# Patient Record
Sex: FEMALE | Race: WHITE | NOT HISPANIC OR LATINO | URBAN - METROPOLITAN AREA
[De-identification: names, ages, dates, MRNs, and addresses within clinical notes are randomized per-mention and may not be internally consistent; named-entity substitution may affect disease eponyms.]

---

## 2020-06-25 ENCOUNTER — TELEPHONE ENCOUNTER (OUTPATIENT)
Dept: URBAN - METROPOLITAN AREA CLINIC 128 | Facility: CLINIC | Age: 75
End: 2020-06-25

## 2020-07-10 ENCOUNTER — OFFICE VISIT (OUTPATIENT)
Dept: URBAN - METROPOLITAN AREA CLINIC 128 | Facility: CLINIC | Age: 75
End: 2020-07-10
Payer: MEDICARE

## 2020-07-10 DIAGNOSIS — Z12.11 COLON CANCER SCREENING: ICD-10-CM

## 2020-07-10 DIAGNOSIS — R12 HEARTBURN: ICD-10-CM

## 2020-07-10 DIAGNOSIS — R13.19 OTHER DYSPHAGIA: ICD-10-CM

## 2020-07-10 PROCEDURE — 3017F COLORECTAL CA SCREEN DOC REV: CPT | Performed by: INTERNAL MEDICINE

## 2020-07-10 PROCEDURE — G9903 PT SCRN TBCO ID AS NON USER: HCPCS | Performed by: INTERNAL MEDICINE

## 2020-07-10 PROCEDURE — 99214 OFFICE O/P EST MOD 30 MIN: CPT | Performed by: INTERNAL MEDICINE

## 2020-07-10 PROCEDURE — G8427 DOCREV CUR MEDS BY ELIG CLIN: HCPCS | Performed by: INTERNAL MEDICINE

## 2020-07-10 PROCEDURE — 1036F TOBACCO NON-USER: CPT | Performed by: INTERNAL MEDICINE

## 2020-07-10 RX ORDER — ESOMEPRAZOLE MAGNESIUM 40 MG/1
CAPSULE, DELAYED RELEASE PELLETS ORAL
Qty: 0 | Refills: 0 | Status: ACTIVE | COMMUNITY
Start: 1900-01-01

## 2020-07-10 RX ORDER — MECLIZINE HYDROCHLORIDE 25 MG/1
TABLET ORAL
Qty: 0 | Refills: 0 | Status: ACTIVE | COMMUNITY
Start: 1900-01-01

## 2020-07-10 RX ORDER — SIMVASTATIN 20 MG/1
TABLET, FILM COATED ORAL
Qty: 0 | Refills: 0 | Status: ACTIVE | COMMUNITY
Start: 1900-01-01

## 2020-07-10 RX ORDER — BUTALBITAL, ASPIRIN, AND CAFFEINE 50; 325; 40 MG/1; MG/1; MG/1
CAPSULE ORAL
Qty: 0 | Refills: 0 | Status: ACTIVE | COMMUNITY
Start: 1900-01-01

## 2020-07-10 RX ORDER — TROSPIUM CHLORIDE 60 MG/1
TAKE 1 CAPSULE (60 MG) BY ORAL ROUTE ONCE DAILY ON AN EMPTY STOMACH 1 HOUR BEFORE MEAL(S) CAPSULE, EXTENDED RELEASE ORAL 1
Qty: 0 | Refills: 0 | Status: ACTIVE | COMMUNITY
Start: 1900-01-01

## 2020-07-10 RX ORDER — ASPIRIN 325 MG/1
TABLET ORAL
Qty: 0 | Refills: 0 | Status: ACTIVE | COMMUNITY
Start: 1900-01-01

## 2020-07-10 RX ORDER — UBIDECARENONE 50 MG
CAPSULE ORAL
Qty: 0 | Refills: 0 | Status: ACTIVE | COMMUNITY
Start: 1900-01-01

## 2020-07-10 RX ORDER — GABAPENTIN 300 MG/1
CAPSULE ORAL
Qty: 0 | Refills: 0 | Status: ACTIVE | COMMUNITY
Start: 1900-01-01

## 2020-07-10 NOTE — PHYSICAL EXAM HENT:
Head,  normocephalic Ears,  External ears within normal limits Nose/Nasopharynx,  External nose  normal appearance  Lips,  Appearance normal

## 2020-07-17 ENCOUNTER — TELEPHONE ENCOUNTER (OUTPATIENT)
Dept: URBAN - METROPOLITAN AREA CLINIC 128 | Facility: CLINIC | Age: 75
End: 2020-07-17

## 2020-10-09 ENCOUNTER — OFFICE VISIT (OUTPATIENT)
Dept: URBAN - METROPOLITAN AREA MEDICAL CENTER 28 | Facility: MEDICAL CENTER | Age: 75
End: 2020-10-09
Payer: MEDICARE

## 2020-10-09 DIAGNOSIS — R13.19 CERVICAL DYSPHAGIA: ICD-10-CM

## 2020-10-09 PROCEDURE — 91010 ESOPHAGUS MOTILITY STUDY: CPT | Performed by: INTERNAL MEDICINE

## 2020-10-16 ENCOUNTER — TELEPHONE ENCOUNTER (OUTPATIENT)
Dept: URBAN - METROPOLITAN AREA CLINIC 92 | Facility: CLINIC | Age: 75
End: 2020-10-16

## 2020-11-18 ENCOUNTER — WEB ENCOUNTER (OUTPATIENT)
Dept: URBAN - METROPOLITAN AREA CLINIC 19 | Facility: CLINIC | Age: 75
End: 2020-11-18

## 2020-11-18 ENCOUNTER — OFFICE VISIT (OUTPATIENT)
Dept: URBAN - METROPOLITAN AREA CLINIC 19 | Facility: CLINIC | Age: 75
End: 2020-11-18
Payer: MEDICARE

## 2020-11-18 DIAGNOSIS — R12 HEARTBURN: ICD-10-CM

## 2020-11-18 DIAGNOSIS — R13.10 ESOPHAGEAL DYSPHAGIA: ICD-10-CM

## 2020-11-18 DIAGNOSIS — R10.9 ABDOMINAL CRAMPING: ICD-10-CM

## 2020-11-18 PROCEDURE — 99214 OFFICE O/P EST MOD 30 MIN: CPT | Performed by: INTERNAL MEDICINE

## 2020-11-18 PROCEDURE — G8427 DOCREV CUR MEDS BY ELIG CLIN: HCPCS | Performed by: INTERNAL MEDICINE

## 2020-11-18 PROCEDURE — 1036F TOBACCO NON-USER: CPT | Performed by: INTERNAL MEDICINE

## 2020-11-18 PROCEDURE — G8482 FLU IMMUNIZE ORDER/ADMIN: HCPCS | Performed by: INTERNAL MEDICINE

## 2020-11-18 RX ORDER — SIMVASTATIN 20 MG/1
TABLET, FILM COATED ORAL
Qty: 0 | Refills: 0 | COMMUNITY
Start: 1900-01-01

## 2020-11-18 RX ORDER — ASPIRIN 325 MG/1
TABLET ORAL
Qty: 0 | Refills: 0 | COMMUNITY
Start: 1900-01-01

## 2020-11-18 RX ORDER — MECLIZINE HYDROCHLORIDE 25 MG/1
TABLET ORAL
Qty: 0 | Refills: 0 | COMMUNITY
Start: 1900-01-01

## 2020-11-18 RX ORDER — BUTALBITAL, ASPIRIN, AND CAFFEINE 50; 325; 40 MG/1; MG/1; MG/1
CAPSULE ORAL
Qty: 0 | Refills: 0 | COMMUNITY
Start: 1900-01-01

## 2020-11-18 RX ORDER — GABAPENTIN 300 MG/1
CAPSULE ORAL
Qty: 0 | Refills: 0 | COMMUNITY
Start: 1900-01-01

## 2020-11-18 RX ORDER — TROSPIUM CHLORIDE 60 MG/1
TAKE 1 CAPSULE (60 MG) BY ORAL ROUTE ONCE DAILY ON AN EMPTY STOMACH 1 HOUR BEFORE MEAL(S) CAPSULE, EXTENDED RELEASE ORAL 1
Qty: 0 | Refills: 0 | COMMUNITY
Start: 1900-01-01

## 2020-11-18 RX ORDER — ESOMEPRAZOLE MAGNESIUM 40 MG/1
CAPSULE, DELAYED RELEASE PELLETS ORAL
Qty: 0 | Refills: 0 | COMMUNITY
Start: 1900-01-01

## 2020-11-18 RX ORDER — UBIDECARENONE 50 MG
CAPSULE ORAL
Qty: 0 | Refills: 0 | COMMUNITY
Start: 1900-01-01

## 2020-11-18 NOTE — HPI-TODAY'S VISIT:
Mrs. Yeh is a 75 year old female who was last seen in GI clinic on 7/10/2020 for dysphagia and epigastric abdominal pain.     On 10/9/2020 she had an esophageal manometry which showed 4/10 swallows were hypertensive with DCI greater than or near 5000. 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   Her daughter reports since she was last seen in GI clinic she was at [facility] for SHALINI and low BP. She reports being on fludrocortisone to help raise her BP.   Today Mrs. Yeh reports her swallowing has not been bothersome recently.   Prior history is summarized below: -She reports having dysphagia symptoms since approximately 2006. She estimates she has had up to 10 dilations in the past. She reports her last dilation was around Fall 2017.  -She reports her last colonoscopy was in 2017 with Dr. Alejo.  -Mrs. Yeh also reports having epigastric abdominal pain. She reports has been present since she was approximately 26 years old. She reported taking nexium 40mg daily since approximately 1/2019.  -On 11/8/2019 she had an EGD performed. The EGD showed a medium hiatal hernia and minimal gastritis. During the EGD she was dilated to 20 mm. Pathology of the mid esophagus and distal esophagus was suggestive for reflux associated changes and was without eosinophilic esophagitis findings. Gastric biopsies were negative for H. pylori. -On 12/20/2019 she had a barium swallow which showed a moderate hiatal hernia with gastroesophageal reflux. To and fro intraesophageal reflux was also noted.  -CT A/P on 1/27/2020 showed small hiatal hernia. -On 4/29/2020 we switched to dexilant as nexium, protonix, and prilosec were not working. She reports dexilant was to expensive. We subsequently prescribed carafate which she stopped taking as it made her lightheaded and dizzy. She reports after stopping carafate the lightheadness and dizziness stopped.  She is currently on nexium 40mg daily.

## 2022-07-01 ENCOUNTER — OFFICE VISIT (OUTPATIENT)
Dept: URBAN - METROPOLITAN AREA CLINIC 128 | Facility: CLINIC | Age: 77
End: 2022-07-01
Payer: MEDICARE

## 2022-07-01 ENCOUNTER — TELEPHONE ENCOUNTER (OUTPATIENT)
Dept: URBAN - METROPOLITAN AREA CLINIC 19 | Facility: CLINIC | Age: 77
End: 2022-07-01

## 2022-07-01 VITALS
HEIGHT: 62 IN | SYSTOLIC BLOOD PRESSURE: 149 MMHG | WEIGHT: 139 LBS | BODY MASS INDEX: 25.58 KG/M2 | TEMPERATURE: 97.8 F | DIASTOLIC BLOOD PRESSURE: 79 MMHG

## 2022-07-01 DIAGNOSIS — Z86.010 PERSONAL HISTORY OF COLONIC POLYPS: ICD-10-CM

## 2022-07-01 PROCEDURE — 99212 OFFICE O/P EST SF 10 MIN: CPT | Performed by: INTERNAL MEDICINE

## 2022-07-01 RX ORDER — FLUDROCORTISONE ACETATE 0.1 MG/1
1 TABLET TABLET ORAL ONCE A DAY
Status: ACTIVE | COMMUNITY

## 2022-07-01 RX ORDER — GABAPENTIN 300 MG/1
CAPSULE ORAL
Qty: 0 | Refills: 0 | Status: ACTIVE | COMMUNITY
Start: 1900-01-01

## 2022-07-01 RX ORDER — BUTALBITAL, ASPIRIN, AND CAFFEINE 50; 325; 40 MG/1; MG/1; MG/1
CAPSULE ORAL
Qty: 0 | Refills: 0 | Status: ACTIVE | COMMUNITY
Start: 1900-01-01

## 2022-07-01 RX ORDER — ASPIRIN 325 MG/1
TABLET ORAL
Qty: 0 | Refills: 0 | Status: DISCONTINUED | COMMUNITY
Start: 1900-01-01

## 2022-07-01 RX ORDER — TRAZODONE HYDROCHLORIDE 100 MG/1
1 TABLET AT BEDTIME TABLET ORAL ONCE A DAY
Status: ACTIVE | COMMUNITY

## 2022-07-01 RX ORDER — SIMVASTATIN 20 MG/1
TABLET, FILM COATED ORAL
Qty: 0 | Refills: 0 | Status: ACTIVE | COMMUNITY
Start: 1900-01-01

## 2022-07-01 RX ORDER — TROSPIUM CHLORIDE 60 MG/1
TAKE 1 CAPSULE (60 MG) BY ORAL ROUTE ONCE DAILY ON AN EMPTY STOMACH 1 HOUR BEFORE MEAL(S) CAPSULE, EXTENDED RELEASE ORAL 1
Qty: 0 | Refills: 0 | Status: ACTIVE | COMMUNITY
Start: 1900-01-01

## 2022-07-01 RX ORDER — UBIDECARENONE 50 MG
CAPSULE ORAL
Qty: 0 | Refills: 0 | Status: ACTIVE | COMMUNITY
Start: 1900-01-01

## 2022-07-01 RX ORDER — RALOXIFENE HYDROCHLORIDE 60 MG/1
1 TABLET TABLET, FILM COATED ORAL ONCE A DAY
Status: ACTIVE | COMMUNITY

## 2022-07-01 RX ORDER — ESOMEPRAZOLE MAGNESIUM 40 MG/1
CAPSULE, DELAYED RELEASE PELLETS ORAL
Qty: 0 | Refills: 0 | Status: ACTIVE | COMMUNITY
Start: 1900-01-01

## 2022-07-01 RX ORDER — MECLIZINE HYDROCHLORIDE 25 MG/1
TABLET ORAL
Qty: 0 | Refills: 0 | Status: ACTIVE | COMMUNITY
Start: 1900-01-01

## 2022-07-01 RX ORDER — ASPIRIN 325 MG/1
TABLET ORAL
Qty: 0 | Refills: 0 | Status: ACTIVE | COMMUNITY
Start: 1900-01-01

## 2022-07-01 RX ORDER — METOPROLOL TARTRATE 50 MG/1
1 TABLET WITH FOOD TABLET, FILM COATED ORAL TWICE A DAY
Status: ACTIVE | COMMUNITY

## 2022-07-01 RX ORDER — POTASSIUM CHLORIDE 7.46 G/1000ML
AS DIRECTED INJECTION, SOLUTION INTRAVENOUS
Status: ACTIVE | COMMUNITY

## 2022-07-01 RX ORDER — QUETIAPINE 25 MG/1
1 TABLET AT BEDTIME TABLET, FILM COATED ORAL ONCE A DAY
Refills: 0 | Status: ACTIVE | COMMUNITY
Start: 1900-01-01

## 2022-07-01 NOTE — HPI-TODAY'S VISIT:
Mrs. Yeh is a 76 year old female who was last seen in GI clinic on 11/18/2020 for dysphagia and ibgard.    On 5/4/2015 colonoscopy was performed with Dr. Alejo. His report was reviewed today and showed diverticulosis in the sigmoid and descending colon. Repeat colonoscopy was recommended in 5 years.   Prior history is summarized below: -She reports having dysphagia symptoms since approximately 2006. She estimates she has had up to 10 dilations in the past. She reports her last dilation was around Fall 2017.  -She reports her last colonoscopy was in 2017 with Dr. Alejo.  -Mrs. Yeh also reports having epigastric abdominal pain. She reports has been present since she was approximately 26 years old. She reported taking nexium 40mg daily since approximately 1/2019.  -On 11/8/2019 she had an EGD performed. The EGD showed a medium hiatal hernia and minimal gastritis. During the EGD she was dilated to 20 mm. Pathology of the mid esophagus and distal esophagus was suggestive for reflux associated changes and was without eosinophilic esophagitis findings. Gastric biopsies were negative for H. pylori. -On 12/20/2019 she had a barium swallow which showed a moderate hiatal hernia with gastroesophageal reflux. To and fro intraesophageal reflux was also noted.  -CT A/P on 1/27/2020 showed small hiatal hernia. -On 4/29/2020 we switched to dexilant as nexium, protonix, and prilosec were not working. She reports dexilant was to expensive. We subsequently prescribed carafate which she stopped taking as it made her lightheaded and dizzy. She reports after stopping carafate the lightheadness and dizziness stopped.    She is currently on nexium 40mg daily.  -On 10/9/2020 she had an esophageal manometry which showed 4/10 swallows were hypertensive with DCI greater than or near 5000. 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 On 11/18/2020 Mrs. Yeh reports her swallowing has not been bothersome recently.

## 2022-07-05 PROBLEM — 428283002: Status: ACTIVE | Noted: 2022-07-01

## 2022-07-27 ENCOUNTER — TELEPHONE ENCOUNTER (OUTPATIENT)
Dept: URBAN - METROPOLITAN AREA CLINIC 128 | Facility: CLINIC | Age: 77
End: 2022-07-27

## 2022-07-27 ENCOUNTER — CLAIMS CREATED FROM THE CLAIM WINDOW (OUTPATIENT)
Dept: URBAN - METROPOLITAN AREA CLINIC 4 | Facility: CLINIC | Age: 77
End: 2022-07-27
Payer: MEDICARE

## 2022-07-27 ENCOUNTER — OFFICE VISIT (OUTPATIENT)
Dept: URBAN - METROPOLITAN AREA SURGERY CENTER 31 | Facility: SURGERY CENTER | Age: 77
End: 2022-07-27
Payer: MEDICARE

## 2022-07-27 DIAGNOSIS — K63.89 OTHER SPECIFIED DISEASES OF INTESTINE: ICD-10-CM

## 2022-07-27 DIAGNOSIS — D12.5 BENIGN NEOPLASM OF SIGMOID COLON: ICD-10-CM

## 2022-07-27 DIAGNOSIS — D12.5 ADENOMA OF SIGMOID COLON: ICD-10-CM

## 2022-07-27 DIAGNOSIS — Z86.010 ADENOMAS PERSONAL HISTORY OF COLONIC POLYPS: ICD-10-CM

## 2022-07-27 DIAGNOSIS — K63.89 BACTERIAL OVERGROWTH SYNDROME: ICD-10-CM

## 2022-07-27 PROCEDURE — 88305 TISSUE EXAM BY PATHOLOGIST: CPT | Performed by: PATHOLOGY

## 2022-07-27 PROCEDURE — 45385 COLONOSCOPY W/LESION REMOVAL: CPT | Performed by: INTERNAL MEDICINE

## 2022-07-27 PROCEDURE — G8907 PT DOC NO EVENTS ON DISCHARG: HCPCS | Performed by: INTERNAL MEDICINE

## 2022-07-27 PROCEDURE — 45380 COLONOSCOPY AND BIOPSY: CPT | Performed by: INTERNAL MEDICINE

## 2022-07-27 RX ORDER — SIMVASTATIN 20 MG/1
TABLET, FILM COATED ORAL
Qty: 0 | Refills: 0 | Status: ACTIVE | COMMUNITY
Start: 1900-01-01

## 2022-07-27 RX ORDER — UBIDECARENONE 50 MG
CAPSULE ORAL
Qty: 0 | Refills: 0 | Status: ACTIVE | COMMUNITY
Start: 1900-01-01

## 2022-07-27 RX ORDER — GABAPENTIN 300 MG/1
CAPSULE ORAL
Qty: 0 | Refills: 0 | Status: ACTIVE | COMMUNITY
Start: 1900-01-01

## 2022-07-27 RX ORDER — METOPROLOL TARTRATE 50 MG/1
1 TABLET WITH FOOD TABLET, FILM COATED ORAL TWICE A DAY
Status: ACTIVE | COMMUNITY

## 2022-07-27 RX ORDER — QUETIAPINE 25 MG/1
1 TABLET AT BEDTIME TABLET, FILM COATED ORAL ONCE A DAY
Refills: 0 | Status: ACTIVE | COMMUNITY
Start: 1900-01-01

## 2022-07-27 RX ORDER — ESOMEPRAZOLE MAGNESIUM 40 MG/1
CAPSULE, DELAYED RELEASE PELLETS ORAL
Qty: 0 | Refills: 0 | Status: ACTIVE | COMMUNITY
Start: 1900-01-01

## 2022-07-27 RX ORDER — FLUDROCORTISONE ACETATE 0.1 MG/1
1 TABLET TABLET ORAL ONCE A DAY
Status: ACTIVE | COMMUNITY

## 2022-07-27 RX ORDER — TROSPIUM CHLORIDE 60 MG/1
TAKE 1 CAPSULE (60 MG) BY ORAL ROUTE ONCE DAILY ON AN EMPTY STOMACH 1 HOUR BEFORE MEAL(S) CAPSULE, EXTENDED RELEASE ORAL 1
Qty: 0 | Refills: 0 | Status: ACTIVE | COMMUNITY
Start: 1900-01-01

## 2022-07-27 RX ORDER — TRAZODONE HYDROCHLORIDE 100 MG/1
1 TABLET AT BEDTIME TABLET ORAL ONCE A DAY
Status: ACTIVE | COMMUNITY

## 2022-07-27 RX ORDER — POTASSIUM CHLORIDE 7.46 G/1000ML
AS DIRECTED INJECTION, SOLUTION INTRAVENOUS
Status: ACTIVE | COMMUNITY

## 2022-07-27 RX ORDER — RALOXIFENE HYDROCHLORIDE 60 MG/1
1 TABLET TABLET, FILM COATED ORAL ONCE A DAY
Status: ACTIVE | COMMUNITY

## 2022-07-27 RX ORDER — BUTALBITAL, ASPIRIN, AND CAFFEINE 50; 325; 40 MG/1; MG/1; MG/1
CAPSULE ORAL
Qty: 0 | Refills: 0 | Status: ACTIVE | COMMUNITY
Start: 1900-01-01

## 2022-07-27 RX ORDER — MECLIZINE HYDROCHLORIDE 25 MG/1
TABLET ORAL
Qty: 0 | Refills: 0 | Status: ACTIVE | COMMUNITY
Start: 1900-01-01

## 2022-08-10 ENCOUNTER — CLAIMS CREATED FROM THE CLAIM WINDOW (OUTPATIENT)
Dept: URBAN - METROPOLITAN AREA CLINIC 4 | Facility: CLINIC | Age: 77
End: 2022-08-10
Payer: MEDICARE

## 2022-08-10 ENCOUNTER — OFFICE VISIT (OUTPATIENT)
Dept: URBAN - METROPOLITAN AREA SURGERY CENTER 31 | Facility: SURGERY CENTER | Age: 77
End: 2022-08-10
Payer: MEDICARE

## 2022-08-10 ENCOUNTER — TELEPHONE ENCOUNTER (OUTPATIENT)
Dept: URBAN - METROPOLITAN AREA CLINIC 128 | Facility: CLINIC | Age: 77
End: 2022-08-10

## 2022-08-10 DIAGNOSIS — K31.89 OTHER DISEASES OF STOMACH AND DUODENUM: ICD-10-CM

## 2022-08-10 DIAGNOSIS — R10.13 ABDOMINAL PAIN, EPIGASTRIC: ICD-10-CM

## 2022-08-10 DIAGNOSIS — R10.11 RUQ ABDOMINAL PAIN: ICD-10-CM

## 2022-08-10 DIAGNOSIS — K31.89 GASTRIC FOVEOLAR HYPERPLASIA: ICD-10-CM

## 2022-08-10 PROCEDURE — 88312 SPECIAL STAINS GROUP 1: CPT | Performed by: PATHOLOGY

## 2022-08-10 PROCEDURE — 88305 TISSUE EXAM BY PATHOLOGIST: CPT | Performed by: PATHOLOGY

## 2022-08-10 PROCEDURE — 43239 EGD BIOPSY SINGLE/MULTIPLE: CPT | Performed by: INTERNAL MEDICINE

## 2022-08-10 PROCEDURE — G8907 PT DOC NO EVENTS ON DISCHARG: HCPCS | Performed by: INTERNAL MEDICINE

## 2022-08-10 RX ORDER — SIMVASTATIN 20 MG/1
TABLET, FILM COATED ORAL
Qty: 0 | Refills: 0 | Status: ACTIVE | COMMUNITY
Start: 1900-01-01

## 2022-08-10 RX ORDER — RALOXIFENE HYDROCHLORIDE 60 MG/1
1 TABLET TABLET, FILM COATED ORAL ONCE A DAY
Status: ACTIVE | COMMUNITY

## 2022-08-10 RX ORDER — MECLIZINE HYDROCHLORIDE 25 MG/1
TABLET ORAL
Qty: 0 | Refills: 0 | Status: ACTIVE | COMMUNITY
Start: 1900-01-01

## 2022-08-10 RX ORDER — METOPROLOL TARTRATE 50 MG/1
1 TABLET WITH FOOD TABLET, FILM COATED ORAL TWICE A DAY
Status: ACTIVE | COMMUNITY

## 2022-08-10 RX ORDER — TRAZODONE HYDROCHLORIDE 100 MG/1
1 TABLET AT BEDTIME TABLET ORAL ONCE A DAY
Status: ACTIVE | COMMUNITY

## 2022-08-10 RX ORDER — QUETIAPINE 25 MG/1
1 TABLET AT BEDTIME TABLET, FILM COATED ORAL ONCE A DAY
Refills: 0 | Status: ACTIVE | COMMUNITY
Start: 1900-01-01

## 2022-08-10 RX ORDER — UBIDECARENONE 50 MG
CAPSULE ORAL
Qty: 0 | Refills: 0 | Status: ACTIVE | COMMUNITY
Start: 1900-01-01

## 2022-08-10 RX ORDER — GABAPENTIN 300 MG/1
CAPSULE ORAL
Qty: 0 | Refills: 0 | Status: ACTIVE | COMMUNITY
Start: 1900-01-01

## 2022-08-10 RX ORDER — POTASSIUM CHLORIDE 7.46 G/1000ML
AS DIRECTED INJECTION, SOLUTION INTRAVENOUS
Status: ACTIVE | COMMUNITY

## 2022-08-10 RX ORDER — ESOMEPRAZOLE MAGNESIUM 40 MG/1
CAPSULE, DELAYED RELEASE PELLETS ORAL
Qty: 0 | Refills: 0 | Status: ACTIVE | COMMUNITY
Start: 1900-01-01

## 2022-08-10 RX ORDER — TROSPIUM CHLORIDE 60 MG/1
TAKE 1 CAPSULE (60 MG) BY ORAL ROUTE ONCE DAILY ON AN EMPTY STOMACH 1 HOUR BEFORE MEAL(S) CAPSULE, EXTENDED RELEASE ORAL 1
Qty: 0 | Refills: 0 | Status: ACTIVE | COMMUNITY
Start: 1900-01-01

## 2022-08-10 RX ORDER — FLUDROCORTISONE ACETATE 0.1 MG/1
1 TABLET TABLET ORAL ONCE A DAY
Status: ACTIVE | COMMUNITY

## 2022-08-10 RX ORDER — BUTALBITAL, ASPIRIN, AND CAFFEINE 50; 325; 40 MG/1; MG/1; MG/1
CAPSULE ORAL
Qty: 0 | Refills: 0 | Status: ACTIVE | COMMUNITY
Start: 1900-01-01

## 2022-10-14 ENCOUNTER — DASHBOARD ENCOUNTERS (OUTPATIENT)
Age: 77
End: 2022-10-14

## 2022-10-14 ENCOUNTER — OFFICE VISIT (OUTPATIENT)
Dept: URBAN - METROPOLITAN AREA CLINIC 128 | Facility: CLINIC | Age: 77
End: 2022-10-14
Payer: MEDICARE

## 2022-10-14 ENCOUNTER — WEB ENCOUNTER (OUTPATIENT)
Dept: URBAN - METROPOLITAN AREA CLINIC 128 | Facility: CLINIC | Age: 77
End: 2022-10-14

## 2022-10-14 VITALS
HEIGHT: 62 IN | BODY MASS INDEX: 25.14 KG/M2 | WEIGHT: 136.6 LBS | SYSTOLIC BLOOD PRESSURE: 152 MMHG | TEMPERATURE: 97.7 F | HEART RATE: 59 BPM | DIASTOLIC BLOOD PRESSURE: 83 MMHG

## 2022-10-14 DIAGNOSIS — R10.13 EPIGASTRIC ABDOMINAL PAIN: ICD-10-CM

## 2022-10-14 PROCEDURE — 99214 OFFICE O/P EST MOD 30 MIN: CPT | Performed by: INTERNAL MEDICINE

## 2022-10-14 RX ORDER — MECLIZINE HYDROCHLORIDE 25 MG/1
TABLET ORAL
Qty: 0 | Refills: 0 | Status: ACTIVE | COMMUNITY
Start: 1900-01-01

## 2022-10-14 RX ORDER — QUETIAPINE 25 MG/1
1 TABLET AT BEDTIME TABLET, FILM COATED ORAL ONCE A DAY
Refills: 0 | Status: ACTIVE | COMMUNITY
Start: 1900-01-01

## 2022-10-14 RX ORDER — POTASSIUM CHLORIDE 7.46 G/1000ML
AS DIRECTED INJECTION, SOLUTION INTRAVENOUS
Status: ACTIVE | COMMUNITY

## 2022-10-14 RX ORDER — BUTALBITAL, ASPIRIN, AND CAFFEINE 50; 325; 40 MG/1; MG/1; MG/1
CAPSULE ORAL
Qty: 0 | Refills: 0 | Status: ACTIVE | COMMUNITY
Start: 1900-01-01

## 2022-10-14 RX ORDER — SIMVASTATIN 20 MG/1
TABLET, FILM COATED ORAL
Qty: 0 | Refills: 0 | Status: ACTIVE | COMMUNITY
Start: 1900-01-01

## 2022-10-14 RX ORDER — TROSPIUM CHLORIDE 60 MG/1
TAKE 1 CAPSULE (60 MG) BY ORAL ROUTE ONCE DAILY ON AN EMPTY STOMACH 1 HOUR BEFORE MEAL(S) CAPSULE, EXTENDED RELEASE ORAL 1
Qty: 0 | Refills: 0 | Status: ACTIVE | COMMUNITY
Start: 1900-01-01

## 2022-10-14 RX ORDER — ESOMEPRAZOLE MAGNESIUM 40 MG/1
CAPSULE, DELAYED RELEASE PELLETS ORAL
Qty: 0 | Refills: 0 | Status: ACTIVE | COMMUNITY
Start: 1900-01-01

## 2022-10-14 RX ORDER — METOPROLOL TARTRATE 50 MG/1
1 TABLET WITH FOOD TABLET, FILM COATED ORAL TWICE A DAY
Status: ACTIVE | COMMUNITY

## 2022-10-14 RX ORDER — GABAPENTIN 300 MG/1
CAPSULE ORAL
Qty: 0 | Refills: 0 | Status: ACTIVE | COMMUNITY
Start: 1900-01-01

## 2022-10-14 RX ORDER — FLUDROCORTISONE ACETATE 0.1 MG/1
1 TABLET TABLET ORAL ONCE A DAY
Status: ACTIVE | COMMUNITY

## 2022-10-14 RX ORDER — RALOXIFENE HYDROCHLORIDE 60 MG/1
1 TABLET TABLET, FILM COATED ORAL ONCE A DAY
Status: ACTIVE | COMMUNITY

## 2022-10-14 RX ORDER — UBIDECARENONE 50 MG
CAPSULE ORAL
Qty: 0 | Refills: 0 | Status: ACTIVE | COMMUNITY
Start: 1900-01-01

## 2022-10-14 RX ORDER — TRAZODONE HYDROCHLORIDE 100 MG/1
1 TABLET AT BEDTIME TABLET ORAL ONCE A DAY
Status: ACTIVE | COMMUNITY

## 2022-10-14 NOTE — HPI-TODAY'S VISIT:
Mrs. Yeh is a 76 year old female who was last seen in GI clinic on 7/1/2022 for personal history of colon polyps.    On 7/27/2022 colonoscopy showed two 4-6 mm transverse colon polyps, 3 mm sigmoid colon polyp, diverticulosis in the sigmoid and descending colon and small internal hemorrhoids.   Pathology of the sigmoid colon polyp showed tubular adenoma and transverse colon polyps were benign mucosal polyps.   On 8/10/2022 EGD showed a medium sized hiatal hernia and moderate gastritis.   Pathology of the stomach was negative for H. pylori and duodenal biopsies were negative for Celiac disease.   [facility] report from [facility] was reviewed and on 8/17/2022 RUQ US showed simple right renal cyst for which no further workup was recommended, bowel gas obscures portions of he pancreas, but was otherwise normal.   Today she reports her epigastric abdominal pain has significantly improved.   Prior history is summarized below: -She reports having dysphagia symptoms since approximately 2006. She estimates she has had up to 10 dilations in the past. She reports her last dilation was around Fall 2017.   -On 5/4/2015 colonoscopy was performed with Dr. Alejo. His report was reviewed today and showed diverticulosis in the sigmoid and descending colon. Repeat colonoscopy was recommended in 5 years.  -She reports having colonoscopy in 2017 with Dr. Alejo.  -Mrs. Yeh also reports having epigastric abdominal pain. She reports has been present since she was approximately 26 years old. She reported taking nexium 40mg daily since approximately 1/2019.  -On 11/8/2019 she had an EGD performed. The EGD showed a medium hiatal hernia and minimal gastritis. During the EGD she was dilated to 20 mm. Pathology of the mid esophagus and distal esophagus was suggestive for reflux associated changes and was without eosinophilic esophagitis findings. Gastric biopsies were negative for H. pylori. -On 12/20/2019 she had a barium swallow which showed a moderate hiatal hernia with gastroesophageal reflux. To and fro intraesophageal reflux was also noted.  -CT A/P on 1/27/2020 showed small hiatal hernia.  -On 4/29/2020 we switched to dexilant as nexium, protonix, and prilosec were not working. She reports dexilant was too expensive. We subsequently prescribed carafate which she stopped taking as it made her lightheaded and dizzy. She reports after stopping carafate the lightheadness and dizziness stopped.    She is currently on nexium 40mg daily.  -On 10/9/2020 she had an esophageal manometry which showed 4/10 swallows were hypertensive with DCI greater than or near 5000. 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 On 11/18/2020 Mrs. Yeh reports her swallowing has not been bothersome recently.

## 2024-09-25 ENCOUNTER — TELEPHONE ENCOUNTER (OUTPATIENT)
Dept: URBAN - METROPOLITAN AREA CLINIC 19 | Facility: CLINIC | Age: 79
End: 2024-09-25

## 2024-12-06 ENCOUNTER — OFFICE VISIT (OUTPATIENT)
Dept: URBAN - METROPOLITAN AREA CLINIC 19 | Facility: CLINIC | Age: 79
End: 2024-12-06
Payer: MEDICARE

## 2024-12-06 VITALS
DIASTOLIC BLOOD PRESSURE: 79 MMHG | TEMPERATURE: 98.6 F | BODY MASS INDEX: 23.48 KG/M2 | WEIGHT: 127.6 LBS | HEART RATE: 63 BPM | OXYGEN SATURATION: 95 % | SYSTOLIC BLOOD PRESSURE: 118 MMHG | HEIGHT: 62 IN

## 2024-12-06 DIAGNOSIS — Z86.0100 PERSONAL HISTORY OF COLON POLYPS, UNSPECIFIED: ICD-10-CM

## 2024-12-06 DIAGNOSIS — K59.09 CHRONIC CONSTIPATION: ICD-10-CM

## 2024-12-06 PROCEDURE — 99213 OFFICE O/P EST LOW 20 MIN: CPT | Performed by: NURSE PRACTITIONER

## 2024-12-06 RX ORDER — GABAPENTIN 300 MG/1
CAPSULE ORAL
Qty: 0 | Refills: 0 | Status: ACTIVE | COMMUNITY
Start: 1900-01-01

## 2024-12-06 RX ORDER — METOPROLOL TARTRATE 50 MG/1
1 TABLET WITH FOOD TABLET, FILM COATED ORAL TWICE A DAY
Status: ACTIVE | COMMUNITY

## 2024-12-06 RX ORDER — RALOXIFENE HYDROCHLORIDE 60 MG/1
1 TABLET TABLET, FILM COATED ORAL ONCE A DAY
Status: ACTIVE | COMMUNITY

## 2024-12-06 RX ORDER — QUETIAPINE 25 MG/1
1 TABLET AT BEDTIME TABLET, FILM COATED ORAL ONCE A DAY
Refills: 0 | Status: ACTIVE | COMMUNITY
Start: 1900-01-01

## 2024-12-06 RX ORDER — SIMVASTATIN 20 MG/1
TABLET, FILM COATED ORAL
Qty: 0 | Refills: 0 | Status: ACTIVE | COMMUNITY
Start: 1900-01-01

## 2024-12-06 RX ORDER — ESOMEPRAZOLE MAGNESIUM 40 MG/1
CAPSULE, DELAYED RELEASE PELLETS ORAL
Qty: 0 | Refills: 0 | Status: ACTIVE | COMMUNITY
Start: 1900-01-01

## 2024-12-06 RX ORDER — FLUDROCORTISONE ACETATE 0.1 MG/1
1 TABLET TABLET ORAL ONCE A DAY
Status: ACTIVE | COMMUNITY

## 2024-12-06 RX ORDER — TRAZODONE HYDROCHLORIDE 100 MG/1
1 TABLET AT BEDTIME TABLET ORAL ONCE A DAY
Status: ACTIVE | COMMUNITY

## 2024-12-06 RX ORDER — TROSPIUM CHLORIDE 60 MG/1
TAKE 1 CAPSULE (60 MG) BY ORAL ROUTE ONCE DAILY ON AN EMPTY STOMACH 1 HOUR BEFORE MEAL(S) CAPSULE, EXTENDED RELEASE ORAL 1
Qty: 0 | Refills: 0 | Status: ACTIVE | COMMUNITY
Start: 1900-01-01

## 2024-12-06 RX ORDER — BUTALBITAL, ASPIRIN, AND CAFFEINE 50; 325; 40 MG/1; MG/1; MG/1
CAPSULE ORAL
Qty: 0 | Refills: 0 | Status: ACTIVE | COMMUNITY
Start: 1900-01-01

## 2024-12-06 RX ORDER — MECLIZINE HYDROCHLORIDE 25 MG/1
TABLET ORAL
Qty: 0 | Refills: 0 | Status: ACTIVE | COMMUNITY
Start: 1900-01-01

## 2024-12-06 RX ORDER — POLYETHYLENE GLYCOL 3350, SODIUM SULFATE ANHYDROUS, SODIUM BICARBONATE, SODIUM CHLORIDE, POTASSIUM CHLORIDE 236; 22.74; 6.74; 5.86; 2.97 G/4L; G/4L; G/4L; G/4L; G/4L
4000 MILLITERS POWDER, FOR SOLUTION ORAL ONCE
Qty: 4000 MILLILITER | Refills: 0 | OUTPATIENT
Start: 2024-12-06 | End: 2024-12-07

## 2024-12-06 NOTE — PHYSICAL EXAM CONSTITUTIONAL:
DR KELLEY MADE AWARE OF THE RESULT. Well developed, well nourished, in no acute distress, ambulating without difficulty, normal communication ability

## 2024-12-06 NOTE — HPI-TODAY'S VISIT:
79-year-old female with PMH of CKD3, bipolar, dysautonomia, colon polyps presents for surveillance colonoscopy.  She was last seen in GI clinic by Dr. Lopez 10/14/2022  Last colonoscopy was 7/27/2022 which showed two 4-6 mm transverse colon polyps, 3 mm sigmoid colon polyp, diverticulosis in the sigmoid and descending colon and small internal hemorrhoids.       Pathology of the sigmoid colon polyp showed tubular adenoma and transverse colon polyps were benign mucosal polyps.   11/26/2024 CT A/P with contrast - increased  size of small HH, 3mm right lung nodule, lipid rich lesion right kidney angiomyolipoma, moderate fecal retention.   Takes pepprmint tea for hyperactive peristalsis  Reports chronic constipation, has a hard BM about every 4-5 days. Denies rectal bleeding.  Denies family hx of colon.      Prior history- - - - - -  - She reported having dysphagia symptoms since approximately 2006. She estimated she has had up to 10 dilations in the past. She reported her last dilation was around Fall 2017.        -On 5/4/2015 colonoscopy was performed with Dr. Alejo. His report was reviewed today and showed diverticulosis in the sigmoid and descending colon. Repeat colonoscopy was recommended in 5 years.        -She reported having colonoscopy in 2017 with Dr. Alejo.  - She reported epigastric pain present since she was approximately 26 years old. She reported taking nexium 40mg daily since approximately 1/2019.        -On 11/8/2019 she had an EGD performed. The EGD showed a medium hiatal hernia and minimal gastritis. During the EGD she was dilated to 20 mm. Pathology of the mid esophagus and distal esophagus was suggestive for reflux associated changes and was without eosinophilic esophagitis findings. Gastric biopsies were negative for H. pylori.        -On 12/20/2019 she had a barium swallow which showed a moderate hiatal hernia with gastroesophageal reflux. To and fro intraesophageal reflux was also noted.        -CT A/P on 1/27/2020 showed small hiatal hernia.        -On 4/29/2020 we switched to dexilant as nexium, protonix, and prilosec were not working. She reported dexilant was too expensive. We subsequently prescribed carafate which she stopped taking as it made her lightheaded and dizzy. She reported after stopping carafate the lightheadness and dizziness stopped. She remained on Nexium.        -On 10/9/2020 she had an esophageal manometry which showed 4/10 swallows were hypertensive with DCI greater than or near 5000. 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         -On 7/27/2022 colonoscopy showed two 4-6 mm transverse colon polyps, 3 mm sigmoid colon polyp, diverticulosis in the sigmoid and descending colon and small internal hemorrhoids.        Pathology of the sigmoid colon polyp showed tubular adenoma and transverse colon polyps were benign mucosal polyps.        On 8/10/2022 EGD showed a medium sized hiatal hernia and moderate gastritis.        Pathology of the stomach was negative for H. pylori and duodenal biopsies were negative for Celiac disease.        US report from [facility] was reviewed and on 8/17/2022 RUQ US showed simple right renal cyst for which no further workup was recommended, bowel gas obscures portions of he pancreas, but was otherwise normal.

## 2024-12-06 NOTE — PHYSICAL EXAM SKIN:
Short Stay Endoscopy History and Physical    PCP - Lindy Amor MD     Procedure - EGD with Endoflip  ASA - per anesthesia  Mallampati - per anesthesia  History of Anesthesia problems - no  Family history Anesthesia problems -  no   Plan of anesthesia - General    HPI:  This is a 72 y.o. female here for evaluation of : Dysphagia, EGJOO    ROS:  Constitutional: No fevers, chills, No weight loss  CV: No chest pain  Pulm: No cough, No shortness of breath  Ophtho: No vision changes  GI: see HPI  Derm: No rash    Medical History:  has a past medical history of Acid reflux, Anxiety, Arthritis, Back pain, Bilateral carotid bruits (2024), Cerebrovascular accident (CVA) due to embolism of left posterior cerebral artery (2019), Depression, Ectopic pregnancy, History of psychiatric hospitalization, psychiatric care, Incarcerated hernia, Merkel cell carcinoma, Migraine, Neuropathy, SHAW (obstructive sleep apnea), Pelvic mass in female (10/08/2017), Psychiatric problem, PTSD (post-traumatic stress disorder), Restrictive lung disease, Right hip pain, Suicide attempt, and Therapy.    Surgical History:  has a past surgical history that includes Tonsillectomy; Cholecystectomy; Colonoscopy;  section; Ectopic pregnancy surgery; Oophorectomy; Cardiac catheterization; Umbilical hernia repair (N/A, 2018); Hysterectomy (10/30/2017); Esophagogastroduodenoscopy (N/A, 09/15/2022); Colonoscopy (N/A, 09/15/2022); Cataract extraction w/  intraocular lens implant (Left, 2022); TONSILLECTOMY, ADENOIDECTOMY; Cataract extraction w/ intraocular lens implant (Right, 2022); Peripherally inserted central catheter insertion (N/A, 2023); Esophageal manometry with measurement of impedance (N/A, 10/12/2023); excision-wide local (Right, 2/15/2024); and [place] lymph node biopsy (Right, 2/15/2024).    Family History: family history includes Diabetes in her sister; Heart disease in her father; Hyperlipidemia  No rashes, no jaundice present, good turgor in her mother, sister, sister, and sister; No Known Problems in her brother, brother, brother, brother, brother, brother, brother, daughter, daughter, sister, sister, sister, sister, and son; Stroke in her brother, father, and mother.. Otherwise no colon cancer, inflammatory bowel disease, or GI malignancies.    Social History:  reports that she has never smoked. She has never used smokeless tobacco. She reports that she does not drink alcohol and does not use drugs.    Review of patient's allergies indicates:   Allergen Reactions    Norvasc [amlodipine] Swelling    Buspar [buspirone] Other (See Comments)     Suicidal thoughts    Fluoxetine Other (See Comments)     Suicidal thoughts    Lisinopril Other (See Comments)     Unsure of reaction    Naproxen Other (See Comments)     Weakness, dizziness, chest pain    Pravastatin sodium      Chest pain  And headache       Medications:   Medications Prior to Admission   Medication Sig Dispense Refill Last Dose/Taking    albuterol (PROVENTIL) 2.5 mg /3 mL (0.083 %) nebulizer solution Take 3 mLs (2.5 mg total) by nebulization every 6 (six) hours as needed for Wheezing (wheezing). USE AS DIRECTED 300 mL 3     ammonium lactate 12 % Crea Aaa qd after shower prn dry skin 385 g 11     aspirin (ECOTRIN) 81 MG EC tablet Take 1 tablet (81 mg total) by mouth once daily. 100 tablet 3     budesonide (PULMICORT) 0.5 mg/2 mL nebulizer solution  (Patient not taking: Reported on 10/4/2024)       calcitRIOL (ROCALTROL) 0.5 MCG Cap TAKE 1 CAPSULE(0.5 MCG) BY MOUTH EVERY DAY 30 capsule 3     diclofenac sodium (VOLTAREN ARTHRITIS PAIN) 1 % Gel Apply 2 g topically once daily. 20 g 0     dicyclomine (BENTYL) 20 mg tablet Take 1 tablet (20 mg total) by mouth 3 (three) times daily as needed (abdominal pain). (Patient not taking: Reported on 10/4/2024) 60 tablet 2     DOCUSATE CALCIUM (STOOL SOFTENER ORAL) Take by mouth as needed. Patient unsure of dose       EScitalopram oxalate (LEXAPRO) 20 MG  tablet Take 1 tablet (20 mg total) by mouth Daily. 90 tablet 0     furosemide (LASIX) 40 MG tablet Take 1 tablet (40 mg total) by mouth once daily. 90 tablet 3     gabapentin (NEURONTIN) 100 MG capsule Take 1 capsule (100 mg total) by mouth every evening. 30 capsule 3     HYDROcodone-acetaminophen (NORCO) 7.5-325 mg per tablet Take 1 tablet by mouth every 6 (six) hours as needed for Pain. 90 to last 30 days .   Do not fill until 10/9/24 90 tablet 0     losartan (COZAAR) 25 MG tablet Take 1 tablet (25 mg total) by mouth every evening. 90 tablet 3     metoprolol succinate (TOPROL-XL) 50 MG 24 hr tablet Take 1 tablet (50 mg total) by mouth once daily. 270 tablet 3     mupirocin (BACTROBAN) 2 % ointment Apply topically 2 (two) times daily. (Patient not taking: Reported on 10/4/2024) 30 g 3     omeprazole (PRILOSEC) 40 MG capsule TAKE 1 CAPSULE BY MOUTH EVERY DAY TO PREVENT ASPIRATION 90 capsule 0     PREMARIN vaginal cream Place 0.5 g vaginally twice a week. 30 g 5     rivaroxaban (XARELTO) 20 mg Tab Take 1 tablet (20 mg total) by mouth every evening. 90 tablet 3     SSD 1 % cream Apply 1 g topically 2 (two) times daily. (Patient not taking: Reported on 10/4/2024)          Physical Exam:    Vital Signs: There were no vitals filed for this visit.    Gen: NAD, lying comfortably  HENT: NCAT, oropharynx clear  Eyes: anicteric sclerae, EOMI grossly  Neck: supple, no visible masses/goiter  Cardiac: RRR  Lungs: non-labored breathing  Abd: soft, NT/ND, normoactive BS  Ext: no LE edema, warm, well perfused  Skin: skin intact on exposed body parts, no visible rashes, lesions  Neuro: A&Ox4, neuro exam grossly intact, moves all extremities  Psych: appropriate mood, affect      Labs:  Lab Results   Component Value Date    WBC 6.48 08/22/2024    HGB 13.2 08/22/2024    HCT 40.9 08/22/2024     08/22/2024    CHOL 126 08/22/2024    TRIG 98 08/22/2024    HDL 26 (L) 08/22/2024    ALT 50 (H) 08/22/2024    AST 81 (H) 08/22/2024    NA  140 08/22/2024    K 4.0 08/22/2024     08/22/2024    CREATININE 0.8 08/22/2024    BUN 22 08/22/2024    CO2 24 08/22/2024    TSH 0.802 08/09/2023    INR 1.4 (H) 09/16/2023    HGBA1C 6.1 (H) 08/22/2024       Plan:  EGD with Endoflip for dysphagia, EGJOO.    I have explained the risks and benefits of endoscopy procedures to the patient including but not limited to bleeding, perforation, infection, and death.  The patient was asked if they understand and allowed to ask any further questions to their satisfaction.      Neeru Lovelace MD

## 2025-05-13 ENCOUNTER — OFFICE VISIT (OUTPATIENT)
Dept: URBAN - METROPOLITAN AREA SURGERY CENTER 31 | Facility: SURGERY CENTER | Age: 80
End: 2025-05-13
Payer: MEDICARE

## 2025-05-13 ENCOUNTER — CLAIMS CREATED FROM THE CLAIM WINDOW (OUTPATIENT)
Dept: URBAN - METROPOLITAN AREA CLINIC 4 | Facility: CLINIC | Age: 80
End: 2025-05-13
Payer: MEDICARE

## 2025-05-13 DIAGNOSIS — Z12.11 COLON CANCER SCREENING: ICD-10-CM

## 2025-05-13 DIAGNOSIS — D12.3 BENIGN NEOPLASM OF TRANSVERSE COLON: ICD-10-CM

## 2025-05-13 DIAGNOSIS — Z86.0100 PERSONAL HISTORY OF COLON POLYPS, UNSPECIFIED: ICD-10-CM

## 2025-05-13 DIAGNOSIS — Z86.0100 HISTORY OF COLON POLYPS: ICD-10-CM

## 2025-05-13 DIAGNOSIS — D12.3 ADENOMA OF TRANSVERSE COLON: ICD-10-CM

## 2025-05-13 DIAGNOSIS — K63.5 POLYP OF COLON: ICD-10-CM

## 2025-05-13 DIAGNOSIS — Z86.0100 PERSONAL HISTORY OF COLONIC POLYPS: ICD-10-CM

## 2025-05-13 DIAGNOSIS — K63.5 BENIGN COLON POLYP: ICD-10-CM

## 2025-05-13 PROCEDURE — 45380 COLONOSCOPY AND BIOPSY: CPT | Performed by: INTERNAL MEDICINE

## 2025-05-13 PROCEDURE — 45385 COLONOSCOPY W/LESION REMOVAL: CPT | Performed by: INTERNAL MEDICINE

## 2025-05-13 PROCEDURE — 88305 TISSUE EXAM BY PATHOLOGIST: CPT | Performed by: PATHOLOGY

## 2025-05-13 PROCEDURE — 00811 ANES LWR INTST NDSC NOS: CPT | Performed by: NURSE ANESTHETIST, CERTIFIED REGISTERED

## 2025-05-13 PROCEDURE — 0529F INTRVL 3/>YR PTS CLNSCP DOCD: CPT | Performed by: INTERNAL MEDICINE

## 2025-05-13 RX ORDER — GABAPENTIN 300 MG/1
CAPSULE ORAL
Qty: 0 | Refills: 0 | Status: ACTIVE | COMMUNITY
Start: 1900-01-01

## 2025-05-13 RX ORDER — TRAZODONE HYDROCHLORIDE 100 MG/1
1 TABLET AT BEDTIME TABLET ORAL ONCE A DAY
Status: ACTIVE | COMMUNITY

## 2025-05-13 RX ORDER — BUTALBITAL, ASPIRIN, AND CAFFEINE 50; 325; 40 MG/1; MG/1; MG/1
CAPSULE ORAL
Qty: 0 | Refills: 0 | Status: ACTIVE | COMMUNITY
Start: 1900-01-01

## 2025-05-13 RX ORDER — FLUDROCORTISONE ACETATE 0.1 MG/1
1 TABLET TABLET ORAL ONCE A DAY
Status: ACTIVE | COMMUNITY

## 2025-05-13 RX ORDER — SIMVASTATIN 20 MG/1
TABLET, FILM COATED ORAL
Qty: 0 | Refills: 0 | Status: ACTIVE | COMMUNITY
Start: 1900-01-01

## 2025-05-13 RX ORDER — QUETIAPINE 25 MG/1
1 TABLET AT BEDTIME TABLET, FILM COATED ORAL ONCE A DAY
Refills: 0 | Status: ACTIVE | COMMUNITY
Start: 1900-01-01

## 2025-05-13 RX ORDER — TROSPIUM CHLORIDE 60 MG/1
TAKE 1 CAPSULE (60 MG) BY ORAL ROUTE ONCE DAILY ON AN EMPTY STOMACH 1 HOUR BEFORE MEAL(S) CAPSULE, EXTENDED RELEASE ORAL 1
Qty: 0 | Refills: 0 | Status: ACTIVE | COMMUNITY
Start: 1900-01-01

## 2025-05-13 RX ORDER — MECLIZINE HYDROCHLORIDE 25 MG/1
TABLET ORAL
Qty: 0 | Refills: 0 | Status: ACTIVE | COMMUNITY
Start: 1900-01-01

## 2025-05-13 RX ORDER — RALOXIFENE HYDROCHLORIDE 60 MG/1
1 TABLET TABLET, FILM COATED ORAL ONCE A DAY
Status: ACTIVE | COMMUNITY

## 2025-05-13 RX ORDER — ESOMEPRAZOLE MAGNESIUM 40 MG/1
CAPSULE, DELAYED RELEASE PELLETS ORAL
Qty: 0 | Refills: 0 | Status: ACTIVE | COMMUNITY
Start: 1900-01-01

## 2025-05-13 RX ORDER — METOPROLOL TARTRATE 50 MG/1
1 TABLET WITH FOOD TABLET, FILM COATED ORAL TWICE A DAY
Status: ACTIVE | COMMUNITY

## 2025-05-30 ENCOUNTER — OFFICE VISIT (OUTPATIENT)
Dept: URBAN - METROPOLITAN AREA CLINIC 19 | Facility: CLINIC | Age: 80
End: 2025-05-30
Payer: MEDICARE

## 2025-05-30 DIAGNOSIS — Z86.0100 PERSONAL HISTORY OF COLON POLYPS, UNSPECIFIED: ICD-10-CM

## 2025-05-30 DIAGNOSIS — K59.09 CHRONIC CONSTIPATION: ICD-10-CM

## 2025-05-30 PROCEDURE — 99213 OFFICE O/P EST LOW 20 MIN: CPT | Performed by: NURSE PRACTITIONER

## 2025-05-30 RX ORDER — MECLIZINE HYDROCHLORIDE 25 MG/1
TABLET ORAL
Qty: 0 | Refills: 0 | Status: ACTIVE | COMMUNITY
Start: 1900-01-01

## 2025-05-30 RX ORDER — SIMVASTATIN 20 MG/1
TABLET, FILM COATED ORAL
Qty: 0 | Refills: 0 | Status: ACTIVE | COMMUNITY
Start: 1900-01-01

## 2025-05-30 RX ORDER — GABAPENTIN 300 MG/1
CAPSULE ORAL
Qty: 0 | Refills: 0 | Status: ACTIVE | COMMUNITY
Start: 1900-01-01

## 2025-05-30 RX ORDER — TRAZODONE HYDROCHLORIDE 100 MG/1
1 TABLET AT BEDTIME TABLET ORAL ONCE A DAY
Status: ACTIVE | COMMUNITY

## 2025-05-30 RX ORDER — QUETIAPINE 25 MG/1
1 TABLET AT BEDTIME TABLET, FILM COATED ORAL ONCE A DAY
Refills: 0 | Status: ACTIVE | COMMUNITY
Start: 1900-01-01

## 2025-05-30 RX ORDER — METOPROLOL TARTRATE 50 MG/1
1 TABLET WITH FOOD TABLET, FILM COATED ORAL TWICE A DAY
Status: ACTIVE | COMMUNITY

## 2025-05-30 RX ORDER — TROSPIUM CHLORIDE 60 MG/1
TAKE 1 CAPSULE (60 MG) BY ORAL ROUTE ONCE DAILY ON AN EMPTY STOMACH 1 HOUR BEFORE MEAL(S) CAPSULE, EXTENDED RELEASE ORAL 1
Qty: 0 | Refills: 0 | Status: ACTIVE | COMMUNITY
Start: 1900-01-01

## 2025-05-30 RX ORDER — ESOMEPRAZOLE MAGNESIUM 40 MG/1
CAPSULE, DELAYED RELEASE PELLETS ORAL
Qty: 0 | Refills: 0 | Status: ACTIVE | COMMUNITY
Start: 1900-01-01

## 2025-05-30 RX ORDER — FLUDROCORTISONE ACETATE 0.1 MG/1
1 TABLET TABLET ORAL ONCE A DAY
Status: ACTIVE | COMMUNITY

## 2025-05-30 RX ORDER — RALOXIFENE HYDROCHLORIDE 60 MG/1
1 TABLET TABLET, FILM COATED ORAL ONCE A DAY
Status: ACTIVE | COMMUNITY

## 2025-05-30 RX ORDER — BUTALBITAL, ASPIRIN, AND CAFFEINE 50; 325; 40 MG/1; MG/1; MG/1
CAPSULE ORAL
Qty: 0 | Refills: 0 | Status: ACTIVE | COMMUNITY
Start: 1900-01-01

## 2025-05-30 NOTE — HPI-TODAY'S VISIT:
79-year-old female with PMH of CKD3, bipolar, dysautonomia, colon polyps presents for   She was last seen in GI clinic 12/6/2024 for surveillance colonoscopy Colonoscopy was performed by Dr. Lopez on 5/13/2025 5 mm polyp in the transverse colon, 4 mm polyp in the transverse colon, 3 mm polyp in the transverse colon, diverticulosis in the descending, sigmoid, and ascending colon.  Small internal hemorrhoids.  Path: tubular adenomas, benign 3-year follow-up given  She continues to have issue with constipation. Has one BM about 1-2 times/week. Stools can be hard or soft sometimes, they vary. Occasional straining. Occasional bleeding.  She tried colace which did nothing. She tried 1/2 cap and full cap of miralax daily and same thing. Her PCP recommended she try to add sennokot but she has not tried that yet. Had recent labs and states thyroid levels were good.       Prior history- - - - - - - She reported having dysphagia symptoms since approximately 2006. She estimated she has had up to 10 dilations in the past. She reported her last dilation was around Fall 2017.        -On 5/4/2015 colonoscopy was performed with Dr. Alejo. His report was reviewed today and showed diverticulosis in the sigmoid and descending colon. Repeat colonoscopy was recommended in 5 years.        -She reported having colonoscopy in 2017 with Dr. Alejo.  - She reported epigastric pain present since she was approximately 26 years old. She reported taking nexium 40mg daily since approximately 1/2019.        -On 11/8/2019 she had an EGD performed. The EGD showed a medium hiatal hernia and minimal gastritis. During the EGD she was dilated to 20 mm. Pathology of the mid esophagus and distal esophagus was suggestive for reflux associated changes and was without eosinophilic esophagitis findings. Gastric biopsies were negative for H. pylori.        -On 12/20/2019 she had a barium swallow which showed a moderate hiatal hernia with gastroesophageal reflux. To and fro intraesophageal reflux was also noted.        -CT A/P on 1/27/2020 showed small hiatal hernia.        -On 4/29/2020 we switched to dexilant as nexium, protonix, and prilosec were not working. She reported dexilant was too expensive. We subsequently prescribed carafate which she stopped taking as it made her lightheaded and dizzy. She reported after stopping carafate the lightheadness and dizziness stopped. She remained on Nexium.        -On 10/9/2020 she had an esophageal manometry which showed 4/10 swallows were hypertensive with DCI greater than or near 5000. 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         -On 7/27/2022 colonoscopy showed two 4-6 mm transverse colon polyps, 3 mm sigmoid colon polyp, diverticulosis in the sigmoid and descending colon and small internal hemorrhoids.        Pathology of the sigmoid colon polyp showed tubular adenoma and transverse colon polyps were benign mucosal polyps.        On 8/10/2022 EGD showed a medium sized hiatal hernia and moderate gastritis.        Pathology of the stomach was negative for H. pylori and duodenal biopsies were negative for Celiac disease.        [facility] report from [facility] was reviewed and on 8/17/2022 RUQ US showed simple right renal cyst for which no further workup was recommended, bowel gas obscures portions of he pancreas, but was otherwise normal.  Colonoscopy was 7/27/2022 - two 4-6 mm transverse colon polyps, 3 mm sigmoid colon polyp, diverticulosis in the sigmoid and descending colon and small internal hemorrhoids.       Pathology of the sigmoid colon polyp showed tubular adenoma and transverse colon polyps were benign mucosal polyps.   11/26/2024 CT A/P with contrast - increased  size of small HH, 3mm right lung nodule, lipid rich lesion right kidney angiomyolipoma, moderate fecal retention.